# Patient Record
Sex: FEMALE | Race: WHITE | Employment: OTHER | URBAN - METROPOLITAN AREA
[De-identification: names, ages, dates, MRNs, and addresses within clinical notes are randomized per-mention and may not be internally consistent; named-entity substitution may affect disease eponyms.]

---

## 2020-09-03 ENCOUNTER — HOSPITAL ENCOUNTER (EMERGENCY)
Facility: CLINIC | Age: 45
Discharge: HOME | End: 2020-09-03
Attending: NURSE PRACTITIONER | Admitting: NURSE PRACTITIONER
Payer: COMMERCIAL

## 2020-09-03 VITALS
WEIGHT: 220 LBS | DIASTOLIC BLOOD PRESSURE: 112 MMHG | SYSTOLIC BLOOD PRESSURE: 153 MMHG | HEART RATE: 84 BPM | RESPIRATION RATE: 16 BRPM | BODY MASS INDEX: 37.56 KG/M2 | OXYGEN SATURATION: 98 % | TEMPERATURE: 98.7 F | HEIGHT: 64 IN

## 2020-09-03 DIAGNOSIS — H53.149 PHOTOPHOBIA: ICD-10-CM

## 2020-09-03 DIAGNOSIS — R51.9 HEADACHE: ICD-10-CM

## 2020-09-03 DIAGNOSIS — R11.0 NAUSEA: ICD-10-CM

## 2020-09-03 PROCEDURE — 96374 THER/PROPH/DIAG INJ IV PUSH: CPT

## 2020-09-03 PROCEDURE — 99284 EMERGENCY DEPT VISIT MOD MDM: CPT | Mod: 25

## 2020-09-03 PROCEDURE — 25000128 H RX IP 250 OP 636: Performed by: NURSE PRACTITIONER

## 2020-09-03 PROCEDURE — 25800030 ZZH RX IP 258 OP 636: Performed by: NURSE PRACTITIONER

## 2020-09-03 PROCEDURE — 96361 HYDRATE IV INFUSION ADD-ON: CPT

## 2020-09-03 RX ORDER — KETOROLAC TROMETHAMINE 15 MG/ML
15 INJECTION, SOLUTION INTRAMUSCULAR; INTRAVENOUS ONCE
Status: COMPLETED | OUTPATIENT
Start: 2020-09-03 | End: 2020-09-03

## 2020-09-03 RX ADMIN — SODIUM CHLORIDE 1000 ML: 9 INJECTION, SOLUTION INTRAVENOUS at 17:45

## 2020-09-03 RX ADMIN — KETOROLAC TROMETHAMINE 15 MG: 15 INJECTION, SOLUTION INTRAMUSCULAR; INTRAVENOUS at 17:45

## 2020-09-03 ASSESSMENT — ENCOUNTER SYMPTOMS
HEADACHES: 1
NECK STIFFNESS: 0
NAUSEA: 1
FEVER: 0
VOMITING: 0

## 2020-09-03 ASSESSMENT — MIFFLIN-ST. JEOR: SCORE: 1632.91

## 2020-09-03 NOTE — ED AVS SNAPSHOT
Emergency Department  [address] [facility] [address]  Phone:  [phone]  Fax:  [phone]                                    Jodie Rincon   MRN: [id number]    Department:   Emergency Department   Date of Visit:  9/3/2020           After Visit Summary Signature Page    I have received my discharge instructions, and my questions have been answered. I have discussed any challenges I see with this plan with the nurse or doctor.    ..........................................................................................................................................  Patient/Patient Representative Signature      ..........................................................................................................................................  Patient Representative Print Name and Relationship to Patient    ..................................................               ................................................  Date                                   Time    ..........................................................................................................................................  Reviewed by Signature/Title    ...................................................              ..............................................  Date                                               Time          22EPIC Rev 08/18

## 2020-09-03 NOTE — LETTER
September 3, 2020      To Whom It May Concern:      Jodie Rincon was seen in our Emergency Department today, 09/03/20.  I expect her condition to improve this evening.  She may return to work tomorrow if improved.       Sincerely,          Marleny Jovel NP

## 2020-09-03 NOTE — ED PROVIDER NOTES
"History     Chief Complaint:  Migraine     The history is provided by the patient.     Jodie Rincon is a 45 year old female with a history of migraines among others listed below who presents via EMS for evaluation of migraine that first began today while she was working.The patient reports that her onset of headache today was a \"sharp, stabbing\" pain on the right side of her forehead. She states that her co-worker asked her a question and she \"couldn't come up with words\" to respond, and became nauseas upon shifting her vision. Here, she reports her pain is a 4/10 while sitting, a 8-9/10 with a sudden bright light or loud noise, and a 8/10 when she looks up. Her pain radiates from the right forehead, to right lateral and posterior head. She denies fever, vomiting, or neck stiffness.     Of note, she states that she had a migraine about 18-24 months ago where she presented to urgent care and had relief with Toradol. The patient reports that she used to get migraines every month when she ovulated, but these decreased in severity after giving birth to her second child. Her previous migraines had different features, and she states that she typically experiences \"fuzzy\" vision and tunnel vision prior to their onset, but she did not experience this vision change prior to her migraine onset today. She has been rhiannon-menopausal for approximately three years, only having cramps a few times a year.      Allergies:  Barbiturates  Biaxin  Meperidine Hcl  Aleve  Penicillins  Clarithromycin  Ibuprofen  Norco     Medications:   Albuterol inhaler  Levothyroxine  Vyvanse  Antivert  Mydayis    Medical History:   Anxiety  Migraines  Asthma  IBS  Endometriosis  Ovarian cyst  UTIs  Agoraphobia with panic disorder    Surgical History   Appendectomy   Surgery for chronic UTIs  Cholecystectomy   Tubal ligation    Family History:   Mother -  Hypothyroidism, hypertension, bipolar disorder, heart disease    Social History:  The " patient was accompanied to the ED by EMS.  Smoking Status: Never  Smokeless Tobacco: Never  Alcohol Use: Yes      Review of Systems   Constitutional: Negative for fever.   Gastrointestinal: Positive for nausea. Negative for vomiting.   Musculoskeletal: Negative for neck stiffness.   Neurological: Positive for headaches.   All other systems reviewed and are negative.        Physical Exam     Physical Exam  Physical Exam   Constitutional:  Sitting up in bed with low lighting in the room. Non-toxic appearing.   Head: Head moves freely with normal range of motion. No temporal artery hardening or cording.   ENT: Oropharynx is clear and moist.   Eyes: Conjunctivae pink. EOMs intact. PERRL. No horizontal or vertical nystagmus.   Neck: Normal range of motion. No nuchal rigidity.   Cardiovascular: Regular rate and rhythm. Normal heart sounds. No concerning murmur. Intact distal pulses: radial pulses 2+ on the right, 2+ on the left.   Pulmonary/Chest: No respiratory distress. No decreased breath sounds. Lungs clear throughout.   Abdominal: Soft. Non-tender. No rebound, no guarding.   Musculoskeletal: No peripheral edema. Distal capillary refill and sensation intact.   Neurological: Oriented to person, place, and time. No focal deficits. CN II-XII intact. No facial asymmetry. No dysarthria. No trunchal instability. Upper and lower extremity strength is 5/5 and equal bilaterally. Ambulation is normal.   Skin: Skin is warm and normal in color. No diaphoresis. No rash noted.       Emergency Department Course     Interventions:   1745 Normal Saline 1000 mL IV   1745 Toradol 15 mg IV    Emergency Department Course:    1717 Nursing notes and vitals reviewed.    1720 I performed an exam of the patient as documented above.     1814 Patient rechecked and updated. Her headache is alleviated.     1848 Patient rechecked and updated.      1851 Findings and plan explained to the Patient. Patient discharged home with instructions regarding  supportive care, medications, and reasons to return. The importance of close follow-up was reviewed.     Impression & Plan     Medical Decision Making:  Pt presents with headache and photophobia, no recent injury, no LOC, no visual disturbance, not thunderclap in nature. Pt is afebrile and non-toxic appearing with no nuchal rigidity. Normal neuro exam with no focal deficits. No hardening, cording or tenderness of the temporal artery. No viral symptoms or facial sinus tenderness. These findings are not consistent with ICH, central infection, temporal arteritis, pseudotumor cerebri or sinusitis. This is most consistent with a migraine headache.     Pt responded well to interventions and headache markedly improved prior to discharge. We discussed home, rest, increase fluids and no strenuous activity this evening.     Discussed reasons to return to the ED. Pt amenable to plan.               Diagnosis:     ICD-10-CM    1. Headache  R51    2. Photophobia  H53.149    3. Nausea  R11.0         Disposition:  Discharged to home.    Scribe Disclosure:  I, Lety Godfrey, am serving as a scribe at 5:34 PM on 9/3/2020 to document services personally performed by Marleny Jovel based on my observations and the provider's statements to me.        Marleny Jovel, JESSICA CNP  09/03/20 5842

## 2020-09-03 NOTE — ED TRIAGE NOTES
Patient brought in by EMS from work. Patient developed Migraine today. States migraine is not her typical migraine due to not having vision changes prior to onset of Migraine. Patient states pain is in right eye, right sided occipital region and right cheek. Complains of nausea, denies vomiting.

## 2020-11-06 ENCOUNTER — HOSPITAL ENCOUNTER (EMERGENCY)
Facility: CLINIC | Age: 45
Discharge: HOME | End: 2020-11-06
Attending: EMERGENCY MEDICINE | Admitting: EMERGENCY MEDICINE
Payer: COMMERCIAL

## 2020-11-06 ENCOUNTER — APPOINTMENT (OUTPATIENT)
Dept: GENERAL RADIOLOGY | Facility: CLINIC | Age: 45
End: 2020-11-06
Attending: EMERGENCY MEDICINE
Payer: COMMERCIAL

## 2020-11-06 VITALS
DIASTOLIC BLOOD PRESSURE: 91 MMHG | OXYGEN SATURATION: 98 % | HEIGHT: 64 IN | WEIGHT: 220 LBS | TEMPERATURE: 98.8 F | HEART RATE: 89 BPM | RESPIRATION RATE: 14 BRPM | BODY MASS INDEX: 37.56 KG/M2 | SYSTOLIC BLOOD PRESSURE: 147 MMHG

## 2020-11-06 DIAGNOSIS — R07.9 CHEST PAIN, UNSPECIFIED TYPE: ICD-10-CM

## 2020-11-06 DIAGNOSIS — R06.02 SHORTNESS OF BREATH: ICD-10-CM

## 2020-11-06 DIAGNOSIS — R51.9 NONINTRACTABLE HEADACHE, UNSPECIFIED CHRONICITY PATTERN, UNSPECIFIED HEADACHE TYPE: ICD-10-CM

## 2020-11-06 LAB
ANION GAP SERPL CALCULATED.3IONS-SCNC: 4 MMOL/L (ref 3–14)
BASOPHILS # BLD AUTO: 0 10E9/L (ref 0–0.2)
BASOPHILS NFR BLD AUTO: 0.2 %
BUN SERPL-MCNC: 17 MG/DL (ref 7–30)
CALCIUM SERPL-MCNC: 8.9 MG/DL (ref 8.5–10.1)
CHLORIDE SERPL-SCNC: 108 MMOL/L (ref 94–109)
CO2 SERPL-SCNC: 29 MMOL/L (ref 20–32)
CREAT SERPL-MCNC: 0.79 MG/DL (ref 0.52–1.04)
D DIMER PPP FEU-MCNC: <0.3 UG/ML FEU (ref 0–0.5)
DIFFERENTIAL METHOD BLD: NORMAL
EOSINOPHIL # BLD AUTO: 0.1 10E9/L (ref 0–0.7)
EOSINOPHIL NFR BLD AUTO: 1.3 %
ERYTHROCYTE [DISTWIDTH] IN BLOOD BY AUTOMATED COUNT: 12.2 % (ref 10–15)
GFR SERPL CREATININE-BSD FRML MDRD: >90 ML/MIN/{1.73_M2}
GLUCOSE SERPL-MCNC: 146 MG/DL (ref 70–99)
HCG SERPL QL: NEGATIVE
HCT VFR BLD AUTO: 38.8 % (ref 35–47)
HGB BLD-MCNC: 13.5 G/DL (ref 11.7–15.7)
IMM GRANULOCYTES # BLD: 0 10E9/L (ref 0–0.4)
IMM GRANULOCYTES NFR BLD: 0.2 %
INTERPRETATION ECG - MUSE: NORMAL
LYMPHOCYTES # BLD AUTO: 2.8 10E9/L (ref 0.8–5.3)
LYMPHOCYTES NFR BLD AUTO: 33.4 %
MCH RBC QN AUTO: 29.5 PG (ref 26.5–33)
MCHC RBC AUTO-ENTMCNC: 34.8 G/DL (ref 31.5–36.5)
MCV RBC AUTO: 85 FL (ref 78–100)
MONOCYTES # BLD AUTO: 0.5 10E9/L (ref 0–1.3)
MONOCYTES NFR BLD AUTO: 6 %
NEUTROPHILS # BLD AUTO: 5 10E9/L (ref 1.6–8.3)
NEUTROPHILS NFR BLD AUTO: 58.9 %
NRBC # BLD AUTO: 0 10*3/UL
NRBC BLD AUTO-RTO: 0 /100
PLATELET # BLD AUTO: 293 10E9/L (ref 150–450)
POTASSIUM SERPL-SCNC: 3.6 MMOL/L (ref 3.4–5.3)
RBC # BLD AUTO: 4.58 10E12/L (ref 3.8–5.2)
SODIUM SERPL-SCNC: 141 MMOL/L (ref 133–144)
TROPONIN I SERPL-MCNC: <0.015 UG/L (ref 0–0.04)
WBC # BLD AUTO: 8.5 10E9/L (ref 4–11)

## 2020-11-06 PROCEDURE — 80048 BASIC METABOLIC PNL TOTAL CA: CPT | Performed by: EMERGENCY MEDICINE

## 2020-11-06 PROCEDURE — C9803 HOPD COVID-19 SPEC COLLECT: HCPCS

## 2020-11-06 PROCEDURE — 250N000011 HC RX IP 250 OP 636: Performed by: EMERGENCY MEDICINE

## 2020-11-06 PROCEDURE — 84484 ASSAY OF TROPONIN QUANT: CPT | Performed by: EMERGENCY MEDICINE

## 2020-11-06 PROCEDURE — 71046 X-RAY EXAM CHEST 2 VIEWS: CPT

## 2020-11-06 PROCEDURE — 85379 FIBRIN DEGRADATION QUANT: CPT | Performed by: EMERGENCY MEDICINE

## 2020-11-06 PROCEDURE — 85025 COMPLETE CBC W/AUTO DIFF WBC: CPT | Performed by: EMERGENCY MEDICINE

## 2020-11-06 PROCEDURE — 84703 CHORIONIC GONADOTROPIN ASSAY: CPT | Performed by: EMERGENCY MEDICINE

## 2020-11-06 PROCEDURE — 99285 EMERGENCY DEPT VISIT HI MDM: CPT | Mod: 25

## 2020-11-06 PROCEDURE — 93005 ELECTROCARDIOGRAM TRACING: CPT

## 2020-11-06 PROCEDURE — U0003 INFECTIOUS AGENT DETECTION BY NUCLEIC ACID (DNA OR RNA); SEVERE ACUTE RESPIRATORY SYNDROME CORONAVIRUS 2 (SARS-COV-2) (CORONAVIRUS DISEASE [COVID-19]), AMPLIFIED PROBE TECHNIQUE, MAKING USE OF HIGH THROUGHPUT TECHNOLOGIES AS DESCRIBED BY CMS-2020-01-R: HCPCS | Performed by: EMERGENCY MEDICINE

## 2020-11-06 PROCEDURE — 258N000003 HC RX IP 258 OP 636: Performed by: EMERGENCY MEDICINE

## 2020-11-06 RX ORDER — LEVOTHYROXINE SODIUM 125 UG/1
125 CAPSULE ORAL
COMMUNITY

## 2020-11-06 RX ORDER — KETOROLAC TROMETHAMINE 15 MG/ML
15 INJECTION, SOLUTION INTRAMUSCULAR; INTRAVENOUS ONCE
Status: COMPLETED | OUTPATIENT
Start: 2020-11-06 | End: 2020-11-06

## 2020-11-06 RX ORDER — SODIUM CHLORIDE 9 MG/ML
INJECTION, SOLUTION INTRAVENOUS CONTINUOUS
Status: DISCONTINUED | OUTPATIENT
Start: 2020-11-06 | End: 2020-11-06 | Stop reason: HOSPADM

## 2020-11-06 RX ADMIN — SODIUM CHLORIDE 1000 ML: 9 INJECTION, SOLUTION INTRAVENOUS at 16:10

## 2020-11-06 RX ADMIN — KETOROLAC TROMETHAMINE 15 MG: 15 INJECTION, SOLUTION INTRAMUSCULAR; INTRAVENOUS at 16:11

## 2020-11-06 ASSESSMENT — ENCOUNTER SYMPTOMS
EYE REDNESS: 1
CHEST TIGHTNESS: 1
PHOTOPHOBIA: 1
HEADACHES: 1
SHORTNESS OF BREATH: 1

## 2020-11-06 ASSESSMENT — MIFFLIN-ST. JEOR: SCORE: 1632.91

## 2020-11-06 NOTE — ED PROVIDER NOTES
History     Chief Complaint:    Headache      HPI   Jodie Rincon is a 45 year old female who presents with severe throbbing gradual onset headache that started Tuesday. She took Advil which provided relief. Today at work she had a mild headache and was trying to use different techniques like breathing exercises and Advil with no improvement. The patient notes exacerbation with reading fine details, looking at her computer, and lights. The patient notes that she wears contacts. She states that her coworkers also noticed some blood in the corner of her right eye. She denies trauma or vision changes or pain the eye.    The patient also reports some exertional chest pressure with shortness of breath over the past day or so. She is used to a significant amount of activity and walking at work but noticed increased struggle lately. She does have symmetric leg swelling but mentions that she is on her feet all day. She denies fever, cough, or diarrhea.     Allergies:  Biaxin [Clarithromycin-Fd&C Yellow #10]  Naproxen Sodium  Penicillins  Barbiturates  Meperidine Hcl    Medications:    Albuterol  Mydayis  Zyrtec  Levothyroxine    Past Medical History:    Anxiety  Asthma  IBS  Migraine  Ovarian cyst  Endometriosis  Herpes labialis  Kidney stone  Agoraphobia  Closed dislocation of patella  Hypothyroidism    Past Surgical History:    Appendectomy  Cholecystectomy  Essure tubal ligation    Family History:    Mother: Thyroid disease, bipolar, hypertension    Social History:  The patient was accompanied to the ED by .  Smoking Status: Never Smoker  Smokeless Tobacco: Never Used  Alcohol Use: Positive  Drug Use: Negative  PCP: Florence Sweeney    Marital Status:       Review of Systems   Eyes: Positive for photophobia and redness. Negative for visual disturbance.   Respiratory: Positive for chest tightness and shortness of breath.    Cardiovascular: Positive for leg swelling.   Neurological: Positive for  "headaches.   All other systems reviewed and are negative.    Physical Exam     Patient Vitals for the past 24 hrs:   BP Temp Temp src Pulse Resp SpO2 Height Weight   11/06/20 1700 -- -- -- -- -- 98 % -- --   11/06/20 1630 -- -- -- -- -- 99 % -- --   11/06/20 1600 -- -- -- -- -- 100 % -- --   11/06/20 1530 -- -- -- -- -- 97 % -- --   11/06/20 1500 (!) 147/91 -- -- 89 -- 98 % -- --   11/06/20 1300 (!) 165/95 98.8  F (37.1  C) Oral 100 14 98 % 1.626 m (5' 4\") 99.8 kg (220 lb)       Physical Exam  VS: Reviewed per above  HENT: Mucous membranes moist, no nuchal rigidity  EYES: sclera anicteric.  Right lateral subconjunctival hemorrhage.  Pupils are equal and reactive to light bilaterally.  Extraocular movements are intact.  CV: Rate as noted, regular rhythm.   RESP: Effort normal. Breath sounds are normal bilaterally.  GI: no tenderness/rebound/guarding, not distended.  NEURO: GCS 15, cranial nerves II through XII are intact, 5 out of 5 strength in all 4 extremities, sensation is intact light touch in all 4 extremities  MSK: No deformity of the extremities  SKIN: Warm and dry    Emergency Department Course     ECG:  Indication: Chest tightness  Time: 1551  Vent. Rate 78 bpm. IL interval 128. QRS duration 88. QT/QTc 394/449. P-R-T axis 42 67 40. Normal sinus rhythm. Normal ECG. No significant changes compared to prior ECG dated 1/13/14. Read time: 1559     Imaging:  Radiology findings were communicated with the patient who voiced understanding of the findings.    XR Chest, G/E 2 views:   No evidence of acute changes As per radiology.    Laboratory:  Laboratory findings were communicated with the patient who voiced understanding of the findings.    BMP: Glucose 146 (H) o/w WNL (Creatinine: 0.79)    CBC: WBC: 8.5, HGB: 13.5, PLT: 293    D dimer quantitative: <0.3    1322 Troponin: <0.015    HCG Qualitative: Negative    COVID-19 Virus (Coronavirus), PCR NP Swab: Pending      Interventions:  1610 NS 1L IV  1611 Toradol 15 mg " IV    Emergency Department Course:  Past medical records, nursing notes, and vitals reviewed.    1509 I performed an exam of the patient as documented above.     EKG obtained in the ED, see results above.     IV was inserted and blood was drawn for laboratory testing, results above.    The patient was sent for a Chest X-ray while in the emergency department, results above.     1708 I rechecked the patient and discussed the results of her workup thus far.     Findings and plan explained to the Patient. Patient discharged home with instructions regarding supportive care, medications, and reasons to return. The importance of close follow-up was reviewed.     I personally reviewed the laboratory and imaging results with the Patient and answered all related questions prior to discharge.     Impression & Plan     Covid-19  Jodie Rincon was evaluated during a global COVID-19 pandemic, which necessitated consideration that the patient might be at risk for infection with the SARS-CoV-2 virus that causes COVID-19.   Applicable protocols for evaluation were followed during the patient's care.   COVID-19 was considered as part of the patient's evaluation. The plan for testing is:  a test was obtained during this visit.    Medical Decision Making:  Patient presents to the ER for evaluation of multiple concerns including headache, right subconjunctival hemorrhage, exertional chest pressure and shortness of breath.    On arrival, vital signs are reassuring.  On exam there are no focal neurological deficits.  Lungs are clear to auscultation and heart has a regular rate and rhythm.  She does have a right lateral subconjunctival hemorrhage.    With respect to headache, she does have a history of migraines although feels like this headache is different than previous HAs, as it is not as severe.  There are no headache red flags to suggest occult aneurysmal subarachnoid hemorrhage or meningitis or increased ICP.  Patient was  feeling better after Toradol and some IV fluids.  I do not feel that neuroimaging is indicated at this time.    With respect to right eye subconjunctival hemorrhage.  There is no history of trauma or pain to suggest occult superimposed corneal abrasion.  I suspect this will resolve with time on its own.    With respect to exertional chest pain and shortness of breath, EKG is sinus rhythm without ischemic change.  A single troponin is negative.  I have low suspicion for occult ACS.  D-dimer is within normal range, I have low suspicion for occult PE.  Chest x-ray is clear without evidence of pneumonia, pneumothorax, pulmonary edema.     COVID-19 testing was also sent due to various symptoms and concern that patient might have been exposed to the virus.  Clinically I have low suspicion for occult COVID-19 at this time.    Patient was reassured by these findings.  I encouraged close primary care follow-up.  Return precautions were discussed prior to discharge.      Diagnosis:    ICD-10-CM    1. Nonintractable headache, unspecified chronicity pattern, unspecified headache type  R51.9 Asymptomatic COVID-19 Virus (Coronavirus) by PCR   2. Chest pain, unspecified type  R07.9    3. Shortness of breath  R06.02        Disposition:  Discharged to home.    Scribe Disclosure:  I, Jose Lantigua, am serving as a scribe at 3:09 PM on 11/6/2020 to document services personally performed by Tino Lamas MD based on my observations and the provider's statements to me.        Tino Lamas MD  11/06/20 7922

## 2020-11-06 NOTE — ED AVS SNAPSHOT
[facility] Emergency Dept  [address] [facility] [address]  Phone: [phone]  Fax: [phone]                                    Jodie Rincon   MRN: [id number]    Department: [facility] Emergency Dept   Date of Visit: 11/6/2020           After Visit Summary Signature Page    I have received my discharge instructions, and my questions have been answered. I have discussed any challenges I see with this plan with the nurse or doctor.    ..........................................................................................................................................  Patient/Patient Representative Signature      ..........................................................................................................................................  Patient Representative Print Name and Relationship to Patient    ..................................................               ................................................  Date                                   Time    ..........................................................................................................................................  Reviewed by Signature/Title    ...................................................              ..............................................  Date                                               Time          22EPIC Rev 08/18

## 2020-11-06 NOTE — DISCHARGE INSTRUCTIONS
Discharge Instructions  Headache    You were seen today for a headache. Headaches may be caused by many different things such as muscle tension, sinus inflammation, anxiety and stress, having too little sleep, too much alcohol, some medical conditions or injury. You may have a migraine, which is caused by changes in the blood vessels in your head.  At this time your provider does not find that your headache is a sign of anything dangerous or life-threatening.  However, sometimes the signs of serious illness do not show up right away.      Generally, every Emergency Department visit should have a follow-up clinic visit with either a primary or a specialty clinic/provider. Please follow-up as instructed by your emergency provider today.    Return to the Emergency Department if:  You get a new fever of 100.4 F or higher.  Your headache gets much worse.  You get a stiff neck with your headache.  You get a new headache that is significantly different or worse than headaches you have had before.  You are vomiting (throwing up) and cannot keep food or water down.  You have blurry or double vision or other problems with your eyes.  You have a new weakness on one side of your body.  You have difficulty with balance which is new.  You or your family thinks you are confused.  You have a seizure.    What can I do to help myself?  Pain medications - You may take a pain medication such as Tylenol  (acetaminophen), Advil , Motrin  (ibuprofen) or Aleve  (naproxen).  Take a pain reliever as soon as you notice symptoms.  Starting medications as soon as you start to have symptoms may lessen the amount of pain you have.  Relaxing in a quiet, dark room may help.  Get enough sleep and eat meals regularly.  You may need to watch for certain foods or other things which may trigger your headaches.  Keeping a journal of your headaches and possible triggers may help you and your primary provider to identify things which you should avoid which  may be causing your headaches.  If you were given a prescription for medicine here today, be sure to read all of the information (including the package insert) that comes with your prescription.  This will include important information about the medicine, its side effects, and any warnings that you need to know about.  The pharmacist who fills the prescription can provide more information and answer questions you may have about the medicine.  If you have questions or concerns that the pharmacist cannot address, please call or return to the Emergency Department.   Remember that you can always come back to the Emergency Department if you are not able to see your regular provider in the amount of time listed above, if you get any new symptoms, or if there is anything that worries you.    Discharge Instructions  Chest Pain    You have been seen today for chest pain or discomfort.  At this time, your provider has found no signs that your chest pain is due to a serious or life-threatening condition, (or you have declined more testing and/or admission to the hospital). However, sometimes there is a serious problem that does not show up right away. Your evaluation today may not be complete and you may need further testing and evaluation.     Generally, every Emergency Department visit should have a follow-up clinic visit with either a primary or a specialty clinic/provider. Please follow-up as instructed by your emergency provider today.  Return to the Emergency Department if:  Your chest pain changes, gets worse, starts to happen more often, or comes with less activity.  You are newly short of breath.  You get very weak or tired.  You pass out or faint.  You have any new symptoms, like fever, cough, numb legs, or you cough up blood.  You have anything else that worries you.    Until you follow-up with your regular provider, please do the following:  Take one aspirin daily unless you have an allergy or are told not to by  your provider.  If a stress test appointment has been made, go to the appointment.  If you have questions, contact your regular provider.  Follow-up with your regular provider/clinic as directed; this is very important.    If you were given a prescription for medicine here today, be sure to read all of the information (including the package insert) that comes with your prescription.  This will include important information about the medicine, its side effects, and any warnings that you need to know about.  The pharmacist who fills the prescription can provide more information and answer questions you may have about the medicine.  If you have questions or concerns that the pharmacist cannot address, please call or return to the Emergency Department.       Remember that you can always come back to the Emergency Department if you are not able to see your regular provider in the amount of time listed above, if you get any new symptoms, or if there is anything that worries you.

## 2020-11-07 LAB
SARS-COV-2 RNA SPEC QL NAA+PROBE: ABNORMAL
SPECIMEN SOURCE: ABNORMAL

## 2020-11-08 ENCOUNTER — TELEPHONE (OUTPATIENT)
Dept: EMERGENCY MEDICINE | Facility: CLINIC | Age: 45
End: 2020-11-08

## 2020-11-08 NOTE — TELEPHONE ENCOUNTER
Coronavirus (COVID-19) Notification    Reason for call  Notify of POSITIVE  COVID-19 lab result, assess symptoms,  review [facility] recommendations    Lab Result   Lab test for 2019-nCoV rRt-PCR or SARS-COV-2 PCR  Oropharyngeal AND/OR nasopharyngeal swabs were POSITIVE for 2019-nCoV RNA [OR] SARS-COV-2 RNA (COVID-19) RNA     We have been unable to reach Patient by phone at this time to notify of their Positive COVID-19 result.  Left voicemail message requesting a call back to [phone] [facility] for results.        POSITIVE COVID-19 Letter sent.    Jacquelyn Rincon, MSN, RN